# Patient Record
Sex: FEMALE | Race: BLACK OR AFRICAN AMERICAN | NOT HISPANIC OR LATINO | URBAN - METROPOLITAN AREA
[De-identification: names, ages, dates, MRNs, and addresses within clinical notes are randomized per-mention and may not be internally consistent; named-entity substitution may affect disease eponyms.]

---

## 2024-01-05 ENCOUNTER — HOSPITAL ENCOUNTER (EMERGENCY)
Facility: HOSPITAL | Age: 39
Discharge: HOME | End: 2024-01-05
Payer: COMMERCIAL

## 2024-01-05 VITALS
SYSTOLIC BLOOD PRESSURE: 137 MMHG | HEIGHT: 63 IN | HEART RATE: 92 BPM | TEMPERATURE: 98 F | BODY MASS INDEX: 36.97 KG/M2 | WEIGHT: 208.67 LBS | RESPIRATION RATE: 18 BRPM | DIASTOLIC BLOOD PRESSURE: 89 MMHG | OXYGEN SATURATION: 99 %

## 2024-01-05 DIAGNOSIS — B33.8 RSV INFECTION: Primary | ICD-10-CM

## 2024-01-05 LAB
FLUAV RNA RESP QL NAA+PROBE: NOT DETECTED
FLUBV RNA RESP QL NAA+PROBE: NOT DETECTED
RSV RNA RESP QL NAA+PROBE: DETECTED
SARS-COV-2 RNA RESP QL NAA+PROBE: NOT DETECTED

## 2024-01-05 PROCEDURE — 87637 SARSCOV2&INF A&B&RSV AMP PRB: CPT | Performed by: EMERGENCY MEDICINE

## 2024-01-05 PROCEDURE — 99283 EMERGENCY DEPT VISIT LOW MDM: CPT

## 2024-01-05 RX ORDER — BENZONATATE 100 MG/1
100 CAPSULE ORAL EVERY 8 HOURS
Qty: 21 CAPSULE | Refills: 0 | Status: SHIPPED | OUTPATIENT
Start: 2024-01-05 | End: 2024-01-12

## 2024-01-05 RX ORDER — ONDANSETRON 4 MG/1
4 TABLET, ORALLY DISINTEGRATING ORAL EVERY 8 HOURS PRN
Qty: 20 TABLET | Refills: 0 | Status: SHIPPED | OUTPATIENT
Start: 2024-01-05 | End: 2024-01-12

## 2024-01-05 RX ORDER — ALBUTEROL SULFATE 90 UG/1
2 AEROSOL, METERED RESPIRATORY (INHALATION) EVERY 4 HOURS PRN
Qty: 18 G | Refills: 0 | Status: SHIPPED | OUTPATIENT
Start: 2024-01-05 | End: 2024-02-04

## 2024-01-05 ASSESSMENT — COLUMBIA-SUICIDE SEVERITY RATING SCALE - C-SSRS
2. HAVE YOU ACTUALLY HAD ANY THOUGHTS OF KILLING YOURSELF?: NO
6. HAVE YOU EVER DONE ANYTHING, STARTED TO DO ANYTHING, OR PREPARED TO DO ANYTHING TO END YOUR LIFE?: NO
1. IN THE PAST MONTH, HAVE YOU WISHED YOU WERE DEAD OR WISHED YOU COULD GO TO SLEEP AND NOT WAKE UP?: NO

## 2024-01-05 ASSESSMENT — PAIN SCALES - GENERAL: PAINLEVEL_OUTOF10: 9

## 2024-01-05 ASSESSMENT — PAIN - FUNCTIONAL ASSESSMENT: PAIN_FUNCTIONAL_ASSESSMENT: 0-10

## 2024-01-05 NOTE — Clinical Note
Ramsey Brar was seen and treated in our emergency department on 1/5/2024.  She may return to work on 01/09/2024.       If you have any questions or concerns, please don't hesitate to call.      Natalio Thompson PA-C

## 2024-01-05 NOTE — ED PROVIDER NOTES
HPI   Chief Complaint   Patient presents with    Flu Symptoms     Pt presents to ED for flu sx, congestion, runny nose, congested cough, generalized fatigue. Pt wants covid test.        Is a 38-year-old female coming in for URI symptoms for the last 5 days.  Yesterday she had vomiting.  She also reports that she has had diarrhea.  Patient has intermittent abdominal discomfort.  She states it is a cramping sensation.  She denies any fevers or chills.  Patient has multiple close contacts in the home that are sick.  She denies feeling short of breath however does feel congested.  Patient is otherwise healthy.  No major medical problems.      History provided by:  Patient                      No data recorded                Patient History   Past Medical History:   Diagnosis Date    12 weeks gestation of pregnancy 2021    12 weeks gestation of pregnancy    16 weeks gestation of pregnancy 2021    16 weeks gestation of pregnancy    18 weeks gestation of pregnancy 2021    18 weeks gestation of pregnancy    20 weeks gestation of pregnancy 2021    20 weeks gestation of pregnancy    24 weeks gestation of pregnancy     24 weeks gestation of pregnancy    25 weeks gestation of pregnancy 2021    25 weeks gestation of pregnancy    28 weeks gestation of pregnancy 2021    28 weeks gestation of pregnancy    32 weeks gestation of pregnancy 2021    32 weeks gestation of pregnancy    34 weeks gestation of pregnancy 2021    34 weeks gestation of pregnancy    35 weeks gestation of pregnancy 2021    35 weeks gestation of pregnancy    36 weeks gestation of pregnancy 2021    36 weeks gestation of pregnancy    37 weeks gestation of pregnancy 2021    37 weeks gestation of pregnancy    Abnormal hematological finding on  screening of mother 2020    High risk pregnancy with low PAPPA    Acute vaginitis 2021    BV (bacterial vaginosis)    Contact with and  (suspected) exposure to Zika virus 02/11/2020    Contact with and (suspected) exposure to Zika virus    Decreased fetal movements, third trimester, not applicable or unspecified 07/19/2021    Decreased fetal movements in third trimester, single or unspecified fetus    Elevated blood-pressure reading, without diagnosis of hypertension 03/03/2020    Elevated blood pressure reading    Encounter for pregnancy test, result positive 02/11/2020    Positive urine pregnancy test    Encounter for screening for infections with a predominantly sexual mode of transmission 02/11/2020    Screen for STD (sexually transmitted disease)    Encounter for screening for malignant neoplasm of cervix 03/03/2020    Cervical cancer screening    Encounter for supervision of normal first pregnancy, first trimester 03/03/2020    Encounter for prenatal care in first trimester of first pregnancy    Encounter for supervision of normal first pregnancy, first trimester     Obstetrical visit for first pregnancy, first trimester    Encounter for supervision of normal first pregnancy, third trimester 08/11/2020    Encounter for supervision of normal first pregnancy in third trimester    Encounter for supervision of normal pregnancy, unspecified, second trimester     Encounter for supervision of normal pregnancy in second trimester    Encounter for supervision of other normal pregnancy, first trimester 01/26/2021    Encounter for supervision of normal pregnancy in multigravida in first trimester    Encounter for supervision of other normal pregnancy, second trimester 02/24/2021    Encounter for supervision of other normal pregnancy, second trimester    Obesity complicating pregnancy, unspecified trimester 06/24/2020    Obesity in pregnancy    Other conditions influencing health status 03/03/2020    History of pregnancy    Other conditions influencing health status     History of pregnancy    Other specified diseases and conditions complicating pregnancy  05/22/2020    Back pain during pregnancy    Other specified pregnancy related conditions, second trimester 04/30/2020    Headache in pregnancy, second trimester    Personal history of other diseases of the female genital tract     History of vaginal discharge    Personal history of other diseases of the musculoskeletal system and connective tissue     History of back pain    Personal history of other infectious and parasitic diseases 06/28/2021    History of candidiasis of vagina    Personal history of other specified conditions 03/03/2020    History of chest pain    Personal history of other specified conditions 06/24/2020    History of nausea and vomiting    Personal history of urinary (tract) infections 06/17/2021    History of urinary tract infection    Supervision of high risk pregnancy, unspecified, second trimester 06/24/2020    High-risk pregnancy in second trimester    Unspecified abdominal pain 03/12/2021    Cramp, abdominal    Unspecified infection of urinary tract in pregnancy, unspecified trimester 02/17/2020    UTI in pregnancy     Past Surgical History:   Procedure Laterality Date    OTHER SURGICAL HISTORY  01/04/2021    No history of surgery     No family history on file.  Social History     Tobacco Use    Smoking status: Not on file    Smokeless tobacco: Not on file   Substance Use Topics    Alcohol use: Not on file    Drug use: Not on file       Physical Exam   ED Triage Vitals [01/05/24 1659]   Temp Heart Rate Resp BP   36.7 °C (98 °F) 99 18 137/89      SpO2 Temp Source Heart Rate Source Patient Position   100 % Oral Monitor --      BP Location FiO2 (%)     -- --       Physical Exam  Vitals and nursing note reviewed.   Constitutional:       General: She is not in acute distress.     Appearance: Normal appearance. She is not toxic-appearing.   HENT:      Head: Normocephalic and atraumatic.      Nose: Nose normal.      Mouth/Throat:      Mouth: Mucous membranes are moist.      Pharynx: Oropharynx is  clear.   Eyes:      Extraocular Movements: Extraocular movements intact.      Conjunctiva/sclera: Conjunctivae normal.      Pupils: Pupils are equal, round, and reactive to light.   Cardiovascular:      Rate and Rhythm: Regular rhythm.      Pulses: Normal pulses.      Heart sounds: Normal heart sounds.   Pulmonary:      Effort: Pulmonary effort is normal. No respiratory distress.      Breath sounds: Wheezing present. No decreased breath sounds, rhonchi or rales.   Abdominal:      General: Abdomen is flat. Bowel sounds are normal.      Palpations: Abdomen is soft.      Tenderness: There is no abdominal tenderness.   Musculoskeletal:         General: Normal range of motion.      Cervical back: Normal range of motion and neck supple.   Skin:     General: Skin is warm and dry.      Coloration: Skin is not jaundiced or pale.      Findings: No bruising.   Neurological:      General: No focal deficit present.      Mental Status: She is alert and oriented to person, place, and time. Mental status is at baseline.   Psychiatric:         Mood and Affect: Mood normal.         Behavior: Behavior normal.         ED Course & MDM   Diagnoses as of 01/05/24 1814   RSV infection       Medical Decision Making  Summary:  Medical Decision Making:   Patient presented as described in HPI. Patient case including ROS, PE, and treatment and plan discussed with ED attending if attached as cosigner. Results from labs and or imaging included below if completed. Ramsey Brar  is a 38 y.o. coming in for Patient presents with:  Flu Symptoms: Pt presents to ED for flu sx, congestion, runny nose, congested cough, generalized fatigue. Pt wants covid test.   .  Patient did come back RSV positive.  She does not appear toxic or unwell.  Vitals are stable.  Patient did have vomiting.  She has no abdominal pain to palpation.  Due to patient's complaint as well as recent vomiting as well as having diarrhea patient will be discharged with Zofran.  She  will be treated with Tessalon Perles for cough and an inhaler per her request for occasional wheezing.  Patient will follow-up with her PCP return with any worsening or changing symptoms.    Patient was advised to follow up with PCP or recommended provider in 2-3 days for another evaluation and exam. I advised patient/guardian to return or go to closest emergency room immediately if symptoms change, get worse, new symptoms develop prior to follow up. If there is no improvement in symptoms in the next 24 hours they are advised to return for further evaluation and exam. I also explained the plan and treatment course. Patient/guardian is in agreement with plan, treatment course, and follow up and states verbally that they will comply.    Labs Reviewed  RSV PCR - Abnormal     RSV PCR                       Detected (*)                 Narrative: This assay is an FDA-cleared, in vitro diagnostic nucleic acid amplification test for the detection of RSV from nasopharyngeal specimens, and has been validated for use at [facility]. Negative results do not preclude RSV infections, and should not be used as the sole basis for diagnosis, treatment, or other management decisions. If Influenza A/B and RSV PCR results are negative, testing for Parainfluenza virus, Adenovirus and Metapneumovirus is routinely performed for pediatric oncology and intensive care inpatients at [facility], and is available on other patients by placing an add-on request.                                  SARS-COV-2 AND INFLUENZA A/B PCR - Normal   No orders to display       Tests/Medications/Escalations of Care considered but not given: As in MDM    Patient care discussed with: N/A  Social Determinants affecting care: N/A    Final diagnosis and disposition as documented       Homegoing. I discussed the differential; results and discharge plan with the patient and/or family/friend/caregiver if present.  I emphasized the importance of  follow-up with the physician I referred them to in the timeframe recommended.  I explained reasons for the patient to return to the Emergency Department. They agreed that if they feel their condition is worsening or if they have any other concern they should call 911 immediately for further assistance. I gave the patient an opportunity to ask all questions they had and answered all of them accordingly. They understand return precautions and discharge instructions. The patient and/or family/friend/caregiver expressed understanding verbally and that they would comply.     Disposition: Discharge      This note has been transcribed using voice recognition and may contain grammatical errors, misplaced words, incorrect words, incorrect phrases or other errors.         Procedure  Procedures     Natalio Thompson PA-C  01/05/24 9159

## 2024-11-07 ENCOUNTER — HOSPITAL ENCOUNTER (EMERGENCY)
Facility: HOSPITAL | Age: 39
Discharge: HOME | End: 2024-11-07
Attending: EMERGENCY MEDICINE
Payer: COMMERCIAL

## 2024-11-07 ENCOUNTER — APPOINTMENT (OUTPATIENT)
Dept: RADIOLOGY | Facility: HOSPITAL | Age: 39
End: 2024-11-07
Payer: COMMERCIAL

## 2024-11-07 VITALS
HEIGHT: 63 IN | DIASTOLIC BLOOD PRESSURE: 89 MMHG | RESPIRATION RATE: 18 BRPM | HEART RATE: 68 BPM | OXYGEN SATURATION: 98 % | BODY MASS INDEX: 38.09 KG/M2 | SYSTOLIC BLOOD PRESSURE: 132 MMHG | WEIGHT: 215 LBS | TEMPERATURE: 97.2 F

## 2024-11-07 DIAGNOSIS — J06.9 VIRAL URI: Primary | ICD-10-CM

## 2024-11-07 LAB — SARS-COV-2 RNA RESP QL NAA+PROBE: NOT DETECTED

## 2024-11-07 PROCEDURE — 99283 EMERGENCY DEPT VISIT LOW MDM: CPT | Mod: 25

## 2024-11-07 PROCEDURE — 87635 SARS-COV-2 COVID-19 AMP PRB: CPT | Performed by: EMERGENCY MEDICINE

## 2024-11-07 PROCEDURE — 71046 X-RAY EXAM CHEST 2 VIEWS: CPT

## 2024-11-07 PROCEDURE — 71046 X-RAY EXAM CHEST 2 VIEWS: CPT | Performed by: RADIOLOGY

## 2024-11-07 ASSESSMENT — PAIN - FUNCTIONAL ASSESSMENT: PAIN_FUNCTIONAL_ASSESSMENT: 0-10

## 2024-11-07 ASSESSMENT — PAIN SCALES - GENERAL: PAINLEVEL_OUTOF10: 8

## 2024-11-07 ASSESSMENT — PAIN DESCRIPTION - DESCRIPTORS: DESCRIPTORS: PRESSURE

## 2024-11-07 ASSESSMENT — PAIN DESCRIPTION - PROGRESSION: CLINICAL_PROGRESSION: NOT CHANGED

## 2024-11-07 ASSESSMENT — PAIN DESCRIPTION - LOCATION: LOCATION: CHEST

## 2024-11-07 NOTE — ED TRIAGE NOTES
Pt to ed ariane c/o flu like sx. Pt states for the past 2 weeks she has had a cough and ear fullness. This week week pt states she's had diarrhea and runny nose. Endorses non radiating chest pressure. EKG obtained

## 2024-11-07 NOTE — Clinical Note
Ramsey Brar was seen and treated in our emergency department on 11/7/2024.  She may return to work on 11/09/2024.       If you have any questions or concerns, please don't hesitate to call.      Vivienne Barragan MD

## 2024-11-07 NOTE — ED PROVIDER NOTES
HPI   Chief Complaint   Patient presents with   • Flu Symptoms   • Chest Pain       HPI: []  39-year-old  female history comes in with a few days history of URI symptoms stuffy nose runny nose cough congestion bilateral ear pain cough nonproductive also diarrhea.  No abdominal pain.  No flank pain.  He also generalized bodyaches no fever no chills.  No trouble breathing no shortness of breath no headache no vision changes.  No hematemesis melena medic easier no hemoptysis no recent travel hospitalization or antibiotic use.    Past history: None  Social: Patient denies current tobacco alcohol drug abuse.  REVIEW OF SYSTEMS:    GENERAL.: No weight loss, fatigue, anorexia, insomnia, fever.    EYES: No vision loss, double vision, drainage, eye pain.    ENT: No pharyngitis, dry mouth.  Positive bilateral ear pain    CARDIOPULMONARY: No chest pain, palpitations, syncope, near syncope. No shortness of breath positive for, cough, hemoptysis.    GI: No abdominal pain, change in bowel habits, melena, hematemesis, hematochezia, nausea, vomiting, positive for diarrhea.    : No discharge, dysuria, frequency, urgency, hematuria.    MS: No limb pain, joint pain, joint swelling.    SKIN: No rashes.    PSYCH: No depression, anxiety, suicidality, homicidality.    Review of systems is otherwise negative unless stated above or in history of present illness.  Social history, family history, allergies reviewed.  PHYSICAL EXAM:    GENERAL: Vitals noted, no distress. Alert and oriented  x 3. Non-toxic.      EENT: TMs clear. Posterior oropharynx unremarkable. No meningismus. No LAD.     NECK: Supple. Nontender. No midline tenderness.     CARDIAC: Regular, rate, rhythm. No murmurs rubs or gallops. No JVD    PULMONARY: Lungs clear bilaterally with good aeration. No wheezes rales or rhonchi. No respiratory distress.     ABDOMEN: Soft, nonsurgical. Nontender. No peritoneal signs. Normoactive bowel sounds. No pulsatile masses.      EXTREMITIES: No peripheral edema. Negative Homans bilaterally, no cords.  2+ bounding pulses well-perfused.    SKIN: No rash. Intact.     NEURO: No focal neurologic deficits, NIH score of 0. Cranial nerves normal as tested from II through XII.     MEDICAL DECISION MAKING:  Chest x-ray negative, COVID-negative.    Treatment in ED: None    ED course: Patient remained stable hemodynamic.    Impression: #1 viral URI, #2 viral enteritis    Plan/MDM: 39-year-old female comes in with the few days history of URI symptoms stuffy nose runny nose cough congestion and bilateral ear pain and diarrhea with a very benign examination low concern for pneumonia meningitis meningoencephalitis otitis media or bacterial gastroenteritis or dehydration, patient will be discharged home supportive care advised for outpatient follow-up with primary care doctor with strict return precautions.              Patient History   Past Medical History:   Diagnosis Date   • 12 weeks gestation of pregnancy ([facility]) 01/26/2021    12 weeks gestation of pregnancy   • 16 weeks gestation of pregnancy ([facility]) 02/24/2021    16 weeks gestation of pregnancy   • 18 weeks gestation of pregnancy ([facility]) 03/12/2021    18 weeks gestation of pregnancy   • 20 weeks gestation of pregnancy ([facility]) 03/22/2021    20 weeks gestation of pregnancy   • 24 weeks gestation of pregnancy ([facility])     24 weeks gestation of pregnancy   • 25 weeks gestation of pregnancy ([facility]) 04/26/2021    25 weeks gestation of pregnancy   • 28 weeks gestation of pregnancy ([facility]) 05/21/2021    28 weeks gestation of pregnancy   • 32 weeks gestation of pregnancy ([facility]) 06/14/2021    32 weeks gestation of pregnancy   • 34 weeks gestation of pregnancy ([facility]) 06/28/2021    34 weeks gestation of pregnancy   • 35 weeks gestation of pregnancy ([facility]) 07/07/2021    35 weeks gestation of pregnancy   • 36 weeks gestation of pregnancy ([facility]) 07/12/2021    36 weeks gestation of  pregnancy   • 37 weeks gestation of pregnancy ([facility]) 2021    37 weeks gestation of pregnancy   • Abnormal hematological finding on  screening of mother 2020    High risk pregnancy with low PAPPA   • Acute vaginitis 2021    BV (bacterial vaginosis)   • Contact with and (suspected) exposure to Zika virus 2020    Contact with and (suspected) exposure to Zika virus   • Decreased fetal movements, third trimester, not applicable or unspecified ([facility]) 2021    Decreased fetal movements in third trimester, single or unspecified fetus   • Elevated blood-pressure reading, without diagnosis of hypertension 2020    Elevated blood pressure reading   • Encounter for pregnancy test, result positive ([facility]) 2020    Positive urine pregnancy test   • Encounter for screening for infections with a predominantly sexual mode of transmission 2020    Screen for STD (sexually transmitted disease)   • Encounter for screening for malignant neoplasm of cervix 2020    Cervical cancer screening   • Encounter for supervision of normal first pregnancy, first trimester 2020    Encounter for prenatal care in first trimester of first pregnancy   • Encounter for supervision of normal first pregnancy, first trimester     Obstetrical visit for first pregnancy, first trimester   • Encounter for supervision of normal first pregnancy, third trimester 2020    Encounter for supervision of normal first pregnancy in third trimester   • Encounter for supervision of normal pregnancy, unspecified, second trimester     Encounter for supervision of normal pregnancy in second trimester   • Encounter for supervision of other normal pregnancy, first trimester 2021    Encounter for supervision of normal pregnancy in multigravida in first trimester   • Encounter for supervision of other normal pregnancy, second trimester 2021    Encounter for supervision of other normal  pregnancy, second trimester   • Obesity complicating pregnancy, unspecified trimester ([facility]) 06/24/2020    Obesity in pregnancy   • Other conditions influencing health status 03/03/2020    History of pregnancy   • Other conditions influencing health status     History of pregnancy   • Other specified diseases and conditions complicating pregnancy ([facility]) 05/22/2020    Back pain during pregnancy   • Other specified pregnancy related conditions, second trimester ([facility]) 04/30/2020    Headache in pregnancy, second trimester   • Personal history of other diseases of the female genital tract     History of vaginal discharge   • Personal history of other diseases of the musculoskeletal system and connective tissue     History of back pain   • Personal history of other infectious and parasitic diseases 06/28/2021    History of candidiasis of vagina   • Personal history of other specified conditions 03/03/2020    History of chest pain   • Personal history of other specified conditions 06/24/2020    History of nausea and vomiting   • Personal history of urinary (tract) infections 06/17/2021    History of urinary tract infection   • Supervision of high risk pregnancy, unspecified, second trimester ([facility]) 06/24/2020    High-risk pregnancy in second trimester   • Unspecified abdominal pain 03/12/2021    Cramp, abdominal   • Unspecified infection of urinary tract in pregnancy, unspecified trimester ([facility]) 02/17/2020    UTI in pregnancy     Past Surgical History:   Procedure Laterality Date   • OTHER SURGICAL HISTORY  01/04/2021    No history of surgery     No family history on file.  Social History     Tobacco Use   • Smoking status: Not on file   • Smokeless tobacco: Not on file   Substance Use Topics   • Alcohol use: Not on file   • Drug use: Not on file       Physical Exam   ED Triage Vitals [11/07/24 0751]   Temperature Heart Rate Respirations BP   36.2 °C (97.2 °F) 68 18 132/89      Pulse Ox Temp src Heart Rate  Source Patient Position   98 % -- -- --      BP Location FiO2 (%)     -- --       Physical Exam      ED Course & MDM   ED Course as of 11/07/24 1209   Thu Nov 07, 2024   1159 COVID-negative chest x-ray negative, patient be discharged with supportive care with report outpatient follow-up with strict precaution. [MT]      ED Course User Index  [MT] Vivienne Barragan MD         Diagnoses as of 11/07/24 1209   Viral URI                 No data recorded                                 Medical Decision Making      Procedure  Procedures     Vivienne Barragan MD  11/07/24 1210

## 2025-03-13 ENCOUNTER — HOSPITAL ENCOUNTER (EMERGENCY)
Facility: HOSPITAL | Age: 40
Discharge: HOME | End: 2025-03-13
Attending: EMERGENCY MEDICINE
Payer: COMMERCIAL

## 2025-03-13 VITALS
HEIGHT: 63 IN | DIASTOLIC BLOOD PRESSURE: 63 MMHG | WEIGHT: 205 LBS | OXYGEN SATURATION: 100 % | RESPIRATION RATE: 16 BRPM | BODY MASS INDEX: 36.32 KG/M2 | SYSTOLIC BLOOD PRESSURE: 99 MMHG | HEART RATE: 60 BPM | TEMPERATURE: 97.2 F

## 2025-03-13 DIAGNOSIS — K52.9 GASTROENTERITIS: Primary | ICD-10-CM

## 2025-03-13 LAB
ALBUMIN SERPL BCP-MCNC: 4.1 G/DL (ref 3.4–5)
ALP SERPL-CCNC: 56 U/L (ref 33–110)
ALT SERPL W P-5'-P-CCNC: 8 U/L (ref 7–45)
ANION GAP SERPL CALC-SCNC: 8 MMOL/L (ref 10–20)
APPEARANCE UR: ABNORMAL
AST SERPL W P-5'-P-CCNC: 14 U/L (ref 9–39)
B-HCG SERPL-ACNC: 15 MIU/ML
BACTERIA #/AREA URNS AUTO: ABNORMAL /HPF
BASOPHILS # BLD AUTO: 0.04 X10*3/UL (ref 0–0.1)
BASOPHILS NFR BLD AUTO: 0.7 %
BILIRUB SERPL-MCNC: 0.3 MG/DL (ref 0–1.2)
BILIRUB UR STRIP.AUTO-MCNC: NEGATIVE MG/DL
BUN SERPL-MCNC: 11 MG/DL (ref 6–23)
CALCIUM SERPL-MCNC: 9.2 MG/DL (ref 8.6–10.3)
CHLORIDE SERPL-SCNC: 106 MMOL/L (ref 98–107)
CO2 SERPL-SCNC: 30 MMOL/L (ref 21–32)
COLOR UR: YELLOW
CREAT SERPL-MCNC: 0.89 MG/DL (ref 0.5–1.05)
EGFRCR SERPLBLD CKD-EPI 2021: 86 ML/MIN/1.73M*2
EOSINOPHIL # BLD AUTO: 0.13 X10*3/UL (ref 0–0.7)
EOSINOPHIL NFR BLD AUTO: 2.4 %
ERYTHROCYTE [DISTWIDTH] IN BLOOD BY AUTOMATED COUNT: 15.9 % (ref 11.5–14.5)
FLUAV RNA RESP QL NAA+PROBE: NOT DETECTED
FLUBV RNA RESP QL NAA+PROBE: NOT DETECTED
GLUCOSE SERPL-MCNC: 76 MG/DL (ref 74–99)
GLUCOSE UR STRIP.AUTO-MCNC: NORMAL MG/DL
HCT VFR BLD AUTO: 41.7 % (ref 36–46)
HGB BLD-MCNC: 12.2 G/DL (ref 12–16)
IMM GRANULOCYTES # BLD AUTO: 0.02 X10*3/UL (ref 0–0.7)
IMM GRANULOCYTES NFR BLD AUTO: 0.4 % (ref 0–0.9)
KETONES UR STRIP.AUTO-MCNC: NEGATIVE MG/DL
LACTATE SERPL-SCNC: 0.6 MMOL/L (ref 0.4–2)
LEUKOCYTE ESTERASE UR QL STRIP.AUTO: NEGATIVE
LIPASE SERPL-CCNC: 21 U/L (ref 9–82)
LYMPHOCYTES # BLD AUTO: 2.43 X10*3/UL (ref 1.2–4.8)
LYMPHOCYTES NFR BLD AUTO: 43.9 %
MCH RBC QN AUTO: 20.5 PG (ref 26–34)
MCHC RBC AUTO-ENTMCNC: 29.3 G/DL (ref 32–36)
MCV RBC AUTO: 70 FL (ref 80–100)
MONOCYTES # BLD AUTO: 0.29 X10*3/UL (ref 0.1–1)
MONOCYTES NFR BLD AUTO: 5.2 %
MUCOUS THREADS #/AREA URNS AUTO: ABNORMAL /LPF
NEUTROPHILS # BLD AUTO: 2.62 X10*3/UL (ref 1.2–7.7)
NEUTROPHILS NFR BLD AUTO: 47.4 %
NITRITE UR QL STRIP.AUTO: NEGATIVE
NRBC BLD-RTO: 0 /100 WBCS (ref 0–0)
PH UR STRIP.AUTO: 7.5 [PH]
PLATELET # BLD AUTO: 384 X10*3/UL (ref 150–450)
POTASSIUM SERPL-SCNC: 4.1 MMOL/L (ref 3.5–5.3)
PROT SERPL-MCNC: 7 G/DL (ref 6.4–8.2)
PROT UR STRIP.AUTO-MCNC: ABNORMAL MG/DL
RBC # BLD AUTO: 5.94 X10*6/UL (ref 4–5.2)
RBC # UR STRIP.AUTO: ABNORMAL MG/DL
RBC #/AREA URNS AUTO: ABNORMAL /HPF
SARS-COV-2 RNA RESP QL NAA+PROBE: NOT DETECTED
SODIUM SERPL-SCNC: 140 MMOL/L (ref 136–145)
SP GR UR STRIP.AUTO: 1.03
SQUAMOUS #/AREA URNS AUTO: ABNORMAL /HPF
TRI-PHOS CRY #/AREA UR COMP ASSIST: ABNORMAL /HPF
UROBILINOGEN UR STRIP.AUTO-MCNC: NORMAL MG/DL
WBC # BLD AUTO: 5.5 X10*3/UL (ref 4.4–11.3)
WBC #/AREA URNS AUTO: ABNORMAL /HPF

## 2025-03-13 PROCEDURE — 85025 COMPLETE CBC W/AUTO DIFF WBC: CPT | Performed by: EMERGENCY MEDICINE

## 2025-03-13 PROCEDURE — 87636 SARSCOV2 & INF A&B AMP PRB: CPT | Performed by: EMERGENCY MEDICINE

## 2025-03-13 PROCEDURE — 99284 EMERGENCY DEPT VISIT MOD MDM: CPT | Mod: 25 | Performed by: EMERGENCY MEDICINE

## 2025-03-13 PROCEDURE — 81001 URINALYSIS AUTO W/SCOPE: CPT | Performed by: EMERGENCY MEDICINE

## 2025-03-13 PROCEDURE — 83605 ASSAY OF LACTIC ACID: CPT | Performed by: EMERGENCY MEDICINE

## 2025-03-13 PROCEDURE — 96361 HYDRATE IV INFUSION ADD-ON: CPT

## 2025-03-13 PROCEDURE — 2500000004 HC RX 250 GENERAL PHARMACY W/ HCPCS (ALT 636 FOR OP/ED): Performed by: EMERGENCY MEDICINE

## 2025-03-13 PROCEDURE — 36415 COLL VENOUS BLD VENIPUNCTURE: CPT | Performed by: EMERGENCY MEDICINE

## 2025-03-13 PROCEDURE — 83690 ASSAY OF LIPASE: CPT | Performed by: EMERGENCY MEDICINE

## 2025-03-13 PROCEDURE — 84702 CHORIONIC GONADOTROPIN TEST: CPT | Performed by: EMERGENCY MEDICINE

## 2025-03-13 PROCEDURE — 80053 COMPREHEN METABOLIC PANEL: CPT | Performed by: EMERGENCY MEDICINE

## 2025-03-13 PROCEDURE — 96360 HYDRATION IV INFUSION INIT: CPT

## 2025-03-13 RX ADMIN — SODIUM CHLORIDE 1000 ML: 0.9 INJECTION, SOLUTION INTRAVENOUS at 11:26

## 2025-03-13 ASSESSMENT — PAIN DESCRIPTION - LOCATION: LOCATION: ABDOMEN

## 2025-03-13 ASSESSMENT — PAIN SCALES - GENERAL: PAINLEVEL_OUTOF10: 10 - WORST POSSIBLE PAIN

## 2025-03-13 ASSESSMENT — PAIN - FUNCTIONAL ASSESSMENT: PAIN_FUNCTIONAL_ASSESSMENT: 0-10

## 2025-03-13 ASSESSMENT — COLUMBIA-SUICIDE SEVERITY RATING SCALE - C-SSRS
2. HAVE YOU ACTUALLY HAD ANY THOUGHTS OF KILLING YOURSELF?: NO
1. IN THE PAST MONTH, HAVE YOU WISHED YOU WERE DEAD OR WISHED YOU COULD GO TO SLEEP AND NOT WAKE UP?: NO
6. HAVE YOU EVER DONE ANYTHING, STARTED TO DO ANYTHING, OR PREPARED TO DO ANYTHING TO END YOUR LIFE?: NO

## 2025-03-13 ASSESSMENT — PAIN DESCRIPTION - PAIN TYPE: TYPE: ACUTE PAIN

## 2025-03-13 NOTE — ED PROVIDER NOTES
HPI   Chief Complaint   Patient presents with    Abdominal Pain       This is a 39-year-old female who presents to the emergency department complaining of nausea, vomiting, diarrhea and abdominal pain.  The patient states that her children had a febrile illness last week.  Starting 4 days ago the patient developed nausea and vomiting.  This resolved after 1 day.  She has had persistent diarrhea for the past 3 days.  She reports abdominal pain across the center of her abdomen.  She denies hematuria and dysuria.    Past medical history: None              Patient History   Past Medical History:   Diagnosis Date    12 weeks gestation of pregnancy ([facility]) 2021    12 weeks gestation of pregnancy    16 weeks gestation of pregnancy ([facility]) 2021    16 weeks gestation of pregnancy    18 weeks gestation of pregnancy ([facility]) 2021    18 weeks gestation of pregnancy    20 weeks gestation of pregnancy ([facility]) 2021    20 weeks gestation of pregnancy    24 weeks gestation of pregnancy ([facility])     24 weeks gestation of pregnancy    25 weeks gestation of pregnancy ([facility]) 2021    25 weeks gestation of pregnancy    28 weeks gestation of pregnancy ([facility]) 2021    28 weeks gestation of pregnancy    32 weeks gestation of pregnancy ([facility]) 2021    32 weeks gestation of pregnancy    34 weeks gestation of pregnancy ([facility]) 2021    34 weeks gestation of pregnancy    35 weeks gestation of pregnancy ([facility]) 2021    35 weeks gestation of pregnancy    36 weeks gestation of pregnancy ([facility]) 2021    36 weeks gestation of pregnancy    37 weeks gestation of pregnancy ([facility]) 2021    37 weeks gestation of pregnancy    Abnormal hematological finding on  screening of mother 2020    High risk pregnancy with low PAPPA    Acute vaginitis 2021    BV (bacterial vaginosis)    Contact with and (suspected) exposure to Zika virus 2020    Contact  with and (suspected) exposure to Zika virus    Decreased fetal movements, third trimester, not applicable or unspecified ([facility]) 07/19/2021    Decreased fetal movements in third trimester, single or unspecified fetus    Elevated blood-pressure reading, without diagnosis of hypertension 03/03/2020    Elevated blood pressure reading    Encounter for pregnancy test, result positive ([facility]) 02/11/2020    Positive urine pregnancy test    Encounter for screening for infections with a predominantly sexual mode of transmission 02/11/2020    Screen for STD (sexually transmitted disease)    Encounter for screening for malignant neoplasm of cervix 03/03/2020    Cervical cancer screening    Encounter for supervision of normal first pregnancy, first trimester 03/03/2020    Encounter for prenatal care in first trimester of first pregnancy    Encounter for supervision of normal first pregnancy, first trimester     Obstetrical visit for first pregnancy, first trimester    Encounter for supervision of normal first pregnancy, third trimester 08/11/2020    Encounter for supervision of normal first pregnancy in third trimester    Encounter for supervision of normal pregnancy, unspecified, second trimester     Encounter for supervision of normal pregnancy in second trimester    Encounter for supervision of other normal pregnancy, first trimester 01/26/2021    Encounter for supervision of normal pregnancy in multigravida in first trimester    Encounter for supervision of other normal pregnancy, second trimester 02/24/2021    Encounter for supervision of other normal pregnancy, second trimester    Obesity complicating pregnancy, unspecified trimester ([facility]) 06/24/2020    Obesity in pregnancy    Other conditions influencing health status 03/03/2020    History of pregnancy    Other conditions influencing health status     History of pregnancy    Other specified diseases and conditions complicating pregnancy ([facility]) 05/22/2020     Back pain during pregnancy    Other specified pregnancy related conditions, second trimester ([facility]) 04/30/2020    Headache in pregnancy, second trimester    Personal history of other diseases of the female genital tract     History of vaginal discharge    Personal history of other diseases of the musculoskeletal system and connective tissue     History of back pain    Personal history of other infectious and parasitic diseases 06/28/2021    History of candidiasis of vagina    Personal history of other specified conditions 03/03/2020    History of chest pain    Personal history of other specified conditions 06/24/2020    History of nausea and vomiting    Personal history of urinary (tract) infections 06/17/2021    History of urinary tract infection    Supervision of high risk pregnancy, unspecified, second trimester ([facility]) 06/24/2020    High-risk pregnancy in second trimester    Unspecified abdominal pain 03/12/2021    Cramp, abdominal    Unspecified infection of urinary tract in pregnancy, unspecified trimester ([facility]) 02/17/2020    UTI in pregnancy     Past Surgical History:   Procedure Laterality Date    OTHER SURGICAL HISTORY  01/04/2021    No history of surgery     No family history on file.  Social History     Tobacco Use    Smoking status: Not on file    Smokeless tobacco: Not on file   Substance Use Topics    Alcohol use: Not on file    Drug use: Not on file       Physical Exam   ED Triage Vitals [03/13/25 0941]   Temperature Heart Rate Respirations BP   36.2 °C (97.2 °F) 76 18 141/87      Pulse Ox Temp Source Heart Rate Source Patient Position   99 % Temporal Monitor Sitting      BP Location FiO2 (%)     Left arm --       Physical Exam  Vitals and nursing note reviewed.   HENT:      Head: Normocephalic and atraumatic.      Nose: Nose normal.   Eyes:      Conjunctiva/sclera: Conjunctivae normal.   Cardiovascular:      Rate and Rhythm: Normal rate and regular rhythm.      Pulses: Normal pulses.       Heart sounds: Normal heart sounds.   Pulmonary:      Effort: Pulmonary effort is normal.      Breath sounds: Normal breath sounds.   Abdominal:      General: Bowel sounds are normal.      Palpations: Abdomen is soft.      Tenderness: There is generalized abdominal tenderness. There is no guarding or rebound.   Musculoskeletal:         General: Normal range of motion.      Cervical back: Normal range of motion and neck supple.   Skin:     Findings: No rash.   Neurological:      General: No focal deficit present.      Mental Status: She is alert and oriented to person, place, and time.   Psychiatric:         Mood and Affect: Mood normal.           ED Course & MDM   Diagnoses as of 03/13/25 2045   Gastroenteritis                 No data recorded                                 Medical Decision Making  Differential diagnosis: I have considered the following conditions during my assessment of this patient's condition: Gastritis, gastroenteritis, GERD, food poisoning, ileus, bowel obstruction, hernia, acute appendicitis, cholecystitis, diverticulitis, colitis, renal colic, dehydration, electrolyte abnormality.    This is a 39-year-old female who presents to the emergency department with nausea, vomiting, diarrhea and abdominal pain.  She will be treated with IV fluids.  She will be further evaluated with labs and stool culture.  The patient's labs were unremarkable.  Urinalysis was not consistent with UTI.  hCG was minimally elevated at 15.  This needs to be repeated.  The patient was not able to provide a stool sample in the emergency department.  She likely has gastroenteritis.  She is appropriate for discharge with supportive management.        Procedure  Procedures     Nicolás Sanders MD  03/13/25 2045